# Patient Record
Sex: MALE | Race: BLACK OR AFRICAN AMERICAN | NOT HISPANIC OR LATINO | URBAN - METROPOLITAN AREA
[De-identification: names, ages, dates, MRNs, and addresses within clinical notes are randomized per-mention and may not be internally consistent; named-entity substitution may affect disease eponyms.]

---

## 2018-12-13 ENCOUNTER — EMERGENCY (EMERGENCY)
Facility: HOSPITAL | Age: 43
LOS: 1 days | Discharge: LEFT BEFORE DISCHARGE | End: 2018-12-13
Attending: STUDENT IN AN ORGANIZED HEALTH CARE EDUCATION/TRAINING PROGRAM
Payer: SELF-PAY

## 2018-12-13 VITALS
RESPIRATION RATE: 22 BRPM | DIASTOLIC BLOOD PRESSURE: 96 MMHG | SYSTOLIC BLOOD PRESSURE: 157 MMHG | OXYGEN SATURATION: 98 % | TEMPERATURE: 98 F | WEIGHT: 229.94 LBS | HEART RATE: 86 BPM | HEIGHT: 67 IN

## 2018-12-13 PROCEDURE — 99053 MED SERV 10PM-8AM 24 HR FAC: CPT

## 2018-12-13 PROCEDURE — 99283 EMERGENCY DEPT VISIT LOW MDM: CPT

## 2018-12-13 PROCEDURE — 99283 EMERGENCY DEPT VISIT LOW MDM: CPT | Mod: 25

## 2018-12-14 RX ORDER — ACETAMINOPHEN 500 MG
2 TABLET ORAL
Qty: 56 | Refills: 0 | OUTPATIENT
Start: 2018-12-14 | End: 2018-12-20

## 2018-12-14 RX ORDER — PENICILLIN V POTASSIUM 250 MG
1 TABLET ORAL
Qty: 14 | Refills: 0 | OUTPATIENT
Start: 2018-12-14 | End: 2018-12-20

## 2018-12-14 RX ORDER — PENICILLIN V POTASSIUM 250 MG
500 TABLET ORAL ONCE
Qty: 0 | Refills: 0 | Status: DISCONTINUED | OUTPATIENT
Start: 2018-12-14 | End: 2018-12-18

## 2018-12-14 RX ORDER — ACETAMINOPHEN 500 MG
975 TABLET ORAL ONCE
Qty: 0 | Refills: 0 | Status: DISCONTINUED | OUTPATIENT
Start: 2018-12-14 | End: 2018-12-18

## 2018-12-14 NOTE — ED PROVIDER NOTE - PROGRESS NOTE DETAILS
educated patient about medication for dental pain and need to fu with dentist.   educated on PCN-VK profylaxis to prevent dental infection pt eloped without medication or being seeny by attending

## 2018-12-14 NOTE — ED PROVIDER NOTE - PHYSICAL EXAMINATION
DENTAL: poor dentition. + fractured tooth to right upper molar. no visible drainage.   MOUTH: oral mucosa moist, tongue and uvula midline, oropharynx unremarkable

## 2018-12-14 NOTE — ED PROVIDER NOTE - OBJECTIVE STATEMENT
43 year old male fractured right upper tooth. states that he took alieve without relief of symptoms, tried to see dental earlier today but did not have space. denies fever or chills.

## 2021-02-02 NOTE — ED ADULT TRIAGE NOTE - CADM TRG TX PRIOR TO ARRIVAL
Routing Miewt message to  to PCP    Patient declining Allopurinol    Questioning if everything else looked ok    Frank Perry, RN, BSN, PHN  [facility]    
none

## 2025-02-07 NOTE — ED ADULT TRIAGE NOTE - BP NONINVASIVE DIASTOLIC (MM [facility])
Medication: metformin passed protocol.   Last office visit date: 1/8/25  Next appointment scheduled?: No   Number of refills given: 4     96